# Patient Record
Sex: MALE | Race: WHITE | NOT HISPANIC OR LATINO | Employment: UNEMPLOYED | URBAN - METROPOLITAN AREA
[De-identification: names, ages, dates, MRNs, and addresses within clinical notes are randomized per-mention and may not be internally consistent; named-entity substitution may affect disease eponyms.]

---

## 2018-12-21 ENCOUNTER — HOSPITAL ENCOUNTER (EMERGENCY)
Facility: MEDICAL CENTER | Age: 33
End: 2018-12-21
Attending: EMERGENCY MEDICINE
Payer: MEDICAID

## 2018-12-21 VITALS
HEIGHT: 68 IN | SYSTOLIC BLOOD PRESSURE: 111 MMHG | WEIGHT: 161.82 LBS | BODY MASS INDEX: 24.52 KG/M2 | DIASTOLIC BLOOD PRESSURE: 65 MMHG | RESPIRATION RATE: 14 BRPM | OXYGEN SATURATION: 99 % | HEART RATE: 97 BPM | TEMPERATURE: 96.8 F

## 2018-12-21 DIAGNOSIS — J02.9 PHARYNGITIS, UNSPECIFIED ETIOLOGY: ICD-10-CM

## 2018-12-21 PROCEDURE — 700111 HCHG RX REV CODE 636 W/ 250 OVERRIDE (IP): Performed by: EMERGENCY MEDICINE

## 2018-12-21 PROCEDURE — 99284 EMERGENCY DEPT VISIT MOD MDM: CPT

## 2018-12-21 PROCEDURE — 96372 THER/PROPH/DIAG INJ SC/IM: CPT

## 2018-12-21 RX ORDER — IBUPROFEN 600 MG/1
600 TABLET ORAL ONCE
Status: DISCONTINUED | OUTPATIENT
Start: 2018-12-21 | End: 2018-12-21 | Stop reason: HOSPADM

## 2018-12-21 RX ORDER — DEXAMETHASONE SODIUM PHOSPHATE 4 MG/ML
10 INJECTION, SOLUTION INTRA-ARTICULAR; INTRALESIONAL; INTRAMUSCULAR; INTRAVENOUS; SOFT TISSUE ONCE
Status: COMPLETED | OUTPATIENT
Start: 2018-12-21 | End: 2018-12-21

## 2018-12-21 RX ADMIN — DEXAMETHASONE SODIUM PHOSPHATE 10 MG: 4 INJECTION, SOLUTION INTRAMUSCULAR; INTRAVENOUS at 10:33

## 2018-12-21 RX ADMIN — PENICILLIN G BENZATHINE 1.2 MILLION UNITS: 1200000 INJECTION, SUSPENSION INTRAMUSCULAR at 10:34

## 2018-12-21 ASSESSMENT — PAIN SCALES - GENERAL: PAINLEVEL_OUTOF10: 5

## 2018-12-21 NOTE — ED TRIAGE NOTES
"PT ambulated to triage c/o throat pain.  Chief Complaint   Patient presents with   • Sore Throat     Blood pressure 108/63, pulse (!) 116, temperature 35.8 °C (96.5 °F), temperature source Temporal, resp. rate 18, height 1.727 m (5' 8\"), weight 73.4 kg (161 lb 13.1 oz), SpO2 99 %.      "

## 2018-12-21 NOTE — DISCHARGE INSTRUCTIONS
Follow-up with primary care next week for reevaluation, to establish care, for medication management.    Tylenol and ibuprofen, alternating if needed, as needed for fever discomfort.  You were treated for strep pharyngitis with an intramuscular antibiotic in the emergency department.    Diet and activity as tolerated.    Return to the emergency department for persistent or worsening throat pain, difficulty swallowing or breathing, change in voice, headache/neck pain/stiffness, fever or other new concerns.

## 2018-12-21 NOTE — ED PROVIDER NOTES
"ED Provider Note    CHIEF COMPLAINT  Chief Complaint   Patient presents with   • Sore Throat       HPI  Walker Carrera is a 33 y.o. male who ambulates to the emergency department through triage for \"swollen tonsils.\"  Patient states 2 days of sore throat and swollen tonsils.  Pain with swallow tolerating food and fluids.  Tactile fever and chills.  Minimal nasal congestion.  No ear pain, cough.    History of \"tonsillitis and strep throat\" but cannot recall last treatment, more than 2 years.    REVIEW OF SYSTEMS  See HPI for further details.     PAST MEDICAL HISTORY       SOCIAL HISTORY  Social History     Social History Main Topics   • Smoking status: Current Every Day Smoker     Packs/day: 1.00   • Smokeless tobacco: Never Used   • Alcohol use No   • Drug use: No   • Sexual activity: Not on file       SURGICAL HISTORY  patient denies any surgical history    CURRENT MEDICATIONS  Home Medications    **Home medications have not yet been reviewed for this encounter**         ALLERGIES  No Known Allergies    PHYSICAL EXAM  VITAL SIGNS: /63   Pulse (!) 116   Temp 35.8 °C (96.5 °F) (Temporal)   Resp 18   Ht 1.727 m (5' 8\")   Wt 73.4 kg (161 lb 13.1 oz)   SpO2 99%   BMI 24.60 kg/m²   Pulse ox interpretation: I interpret this pulse ox as normal.  Constitutional: Alert in no apparent distress.  HENT: Normocephalic, atraumatic. Bilateral external ears normal, Nose normal. Moist mucous membranes.  Oropharynx diffusely erythematous.  Tonsils are enlarged but symmetric bilaterally with scattered exudate.  Uvula midline.  Tolerating secretions.  No stridor or dysphonia appear  Eyes: Pupils are equal and reactive, Conjunctiva normal.   Neck: Normal range of motion, Supple.  No meningeal irritation.  Lymphatic: Anterior cervical lymphadenopathy.  Cardiovascular: Mild tachycardia otherwise regular.  No murmur.  Normal peripheral perfusion.  Thorax & Lungs: Normal breath sounds.  No wheezing/rales/ronchi. No " increased work of breathing, clipped speech or retractions.   Skin: Warm, Dry  Musculoskeletal: Good range of motion in all major joints.  Neurologic: Alert and oriented x4.  Ambulates independently.  Psychiatric: Affect normal, Judgment normal, Mood normal.       COURSE & MEDICAL DECISION MAKING  ED evaluation for sore throat most consistent with pharyngitis, suspect bacterial etiology given clinical presentation.  No further clinical evidence for otitis media, sinusitis, meningitis or pneumonia.  No clinical evidence for peritonsillar abscess.  Vital signs are stable, patient is afebrile, mild tachycardia is appropriate in the setting.  No respiratory distress, airway compromise or hypoxia.  Tolerating secretions and medications in the emergency department.  Bicillin IM, Motrin, Decadron given in the emergency department.    Patient is stable for discharge at this time, anticipatory guidance provided, Tylenol or ibuprofen for fever discomfort, close follow-up is encouraged, and strict ED return instructions have been detailed. Patient is agreeable to the disposition and plan.    Patient's blood pressure was elevated in the emergency department, and has been referred to primary care for close monitoring.      FINAL IMPRESSION  (J02.9) Pharyngitis, unspecified etiology      Electronically signed by: Michelle Melissa, 12/21/2018 10:13 AM      This dictation was created using voice recognition software. The accuracy of the dictation is limited to the abilities of the software. I expect there may be some errors of grammar and possibly content. The nursing notes were reviewed and certain aspects of this information were incorporated into this note.

## 2019-01-04 ENCOUNTER — NON-PROVIDER VISIT (OUTPATIENT)
Dept: URGENT CARE | Facility: CLINIC | Age: 34
End: 2019-01-04

## 2019-01-04 DIAGNOSIS — Z02.1 PRE-EMPLOYMENT DRUG SCREENING: ICD-10-CM

## 2019-01-04 LAB
AMP AMPHETAMINE: NORMAL
BAR BARBITURATES: NORMAL
BZO BENZODIAZEPINES: NORMAL
COC COCAINE: NORMAL
INT CON NEG: NORMAL
INT CON POS: NORMAL
MDMA ECSTASY: NORMAL
MET METHAMPHETAMINES: NORMAL
MTD METHADONE: NORMAL
OPI OPIATES: NORMAL
OXY OXYCODONE: NORMAL
PCP PHENCYCLIDINE: NORMAL
POC URINE DRUG SCREEN OCDRS: NORMAL
THC: NORMAL

## 2019-01-04 PROCEDURE — 80305 DRUG TEST PRSMV DIR OPT OBS: CPT | Performed by: NURSE PRACTITIONER

## 2019-01-06 ENCOUNTER — APPOINTMENT (OUTPATIENT)
Dept: RADIOLOGY | Facility: MEDICAL CENTER | Age: 34
End: 2019-01-06
Attending: EMERGENCY MEDICINE
Payer: MEDICAID

## 2019-01-06 ENCOUNTER — HOSPITAL ENCOUNTER (EMERGENCY)
Facility: MEDICAL CENTER | Age: 34
End: 2019-01-06
Attending: EMERGENCY MEDICINE
Payer: MEDICAID

## 2019-01-06 VITALS
BODY MASS INDEX: 25.76 KG/M2 | RESPIRATION RATE: 16 BRPM | HEART RATE: 98 BPM | DIASTOLIC BLOOD PRESSURE: 58 MMHG | SYSTOLIC BLOOD PRESSURE: 122 MMHG | OXYGEN SATURATION: 99 % | WEIGHT: 170 LBS | TEMPERATURE: 97.6 F | HEIGHT: 68 IN

## 2019-01-06 DIAGNOSIS — S93.402A SPRAIN OF LEFT ANKLE, UNSPECIFIED LIGAMENT, INITIAL ENCOUNTER: ICD-10-CM

## 2019-01-06 PROCEDURE — 73610 X-RAY EXAM OF ANKLE: CPT | Mod: LT

## 2019-01-06 PROCEDURE — 99284 EMERGENCY DEPT VISIT MOD MDM: CPT

## 2019-01-06 RX ORDER — NAPROXEN 500 MG/1
500 TABLET ORAL 2 TIMES DAILY WITH MEALS
Qty: 30 TAB | Refills: 0 | Status: SHIPPED | OUTPATIENT
Start: 2019-01-06 | End: 2019-01-06

## 2019-01-06 RX ORDER — NAPROXEN 500 MG/1
500 TABLET ORAL 2 TIMES DAILY WITH MEALS
Qty: 30 TAB | Refills: 0 | Status: SHIPPED | OUTPATIENT
Start: 2019-01-06 | End: 2019-07-02

## 2019-01-06 ASSESSMENT — PAIN SCALES - GENERAL: PAINLEVEL_OUTOF10: 9

## 2019-01-06 ASSESSMENT — LIFESTYLE VARIABLES: DO YOU DRINK ALCOHOL: NO

## 2019-01-06 NOTE — ED NOTES
Pt given discharge instructions/prescription given/ home care instructions explained, pt verbalized understanding of instructions given, pt ambulatory to [facility].

## 2019-01-06 NOTE — ED PROVIDER NOTES
"ED Provider Note    CHIEF COMPLAINT  Chief Complaint   Patient presents with   • Ankle Pain     rolled LT ankle 3 times in 24hrs . pain radiates into knee.     Seen at 11:40 AM    HPI  Walker Carrera is a 33 y.o. male who presents with left ankle sprain.  He rolled his ankle multiple times in the past day.  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  He is aware of the typical clinical course and treatment.  He was here to rule out a fracture.  He denies any numbness, weakness.    REVIEW OF SYSTEMS  See HPI  PAST MEDICAL HISTORY       SOCIAL HISTORY  Social History     Social History Main Topics   • Smoking status: Current Every Day Smoker     Packs/day: 1.00     Types: Cigarettes   • Smokeless tobacco: Never Used   • Alcohol use No   • Drug use: No   • Sexual activity: Not on file       SURGICAL HISTORY  patient denies any surgical history    CURRENT MEDICATIONS  Reviewed.  See Encounter Summary.     ALLERGIES  No Known Allergies    PHYSICAL EXAM  VITAL SIGNS: /58   Pulse 98   Temp 36.4 °C (97.6 °F) (Temporal)   Resp 16   Ht 1.727 m (5' 8\")   Wt 77.1 kg (170 lb)   SpO2 99%   BMI 25.85 kg/m²   Constitutional: Awake, alert in no apparent distress.  HENT: Normocephalic, Bilateral external ears normal. Nose normal.   Eyes: Conjunctiva normal, non-icteric, EOMI.    Thorax & Lungs: Easy unlabored respirations  Cardiovascular:    Abdomen:  No distention  Skin: Visualized skin is  Dry, No erythema, No rash.   Extremities:   Left lower extremity: No tenderness in the left knee, calf or foot.  The patient does have some mild tenderness in the lateral malleoli region.  The foot is grossly normal, there is no ecchymosis.  Dorsalis pedis 2+.  Negative drawer.  Negative Hernandez's.  Neurologic: Alert, Grossly non-focal.   Psychiatric: Affect and Mood normal    RADIOLOGY  DX-ANKLE 3+ VIEWS LEFT   Final Result      Negative left ankle series          Nursing notes and vital " signs were reviewed. (See chart for details)    Decision Making:  This is a 33 y.o. year old male who presents with an ankle inversion injury.  X-rays are negative for fracture.  The patient does not have any sign of ligamentous rupture.  The foot is nontender.  The patient has done this in the past and is well aware of the treatment.  I recommend a sports brace that can be purchased over-the-counter so that he can wear this inside of his foot wear.  No specific follow-up with regard to ankle injuries are required.    DISPOSITION:  Patient will be discharged home in good condition.    Discharge Medications:  Discharge Medication List as of 1/6/2019 12:39 PM      START taking these medications    Details   naproxen (NAPROSYN) 500 MG Tab Take 1 Tab by mouth 2 times a day, with meals., Disp-30 Tab, R-0, Print Rx Paper             The patient was discharged home (see d/c instructions) and told to return immediately for any signs or symptoms listed, or any worsening at all.  The patient verbally agreed to the discharge precautions and follow-up plan which is documented in EPIC.      The patient's blood pressure is elevated today. >120/80. I have referred them to primary care for follow up.       FINAL IMPRESSION  1. Sprain of left ankle, unspecified ligament, initial encounter

## 2019-01-06 NOTE — ED TRIAGE NOTES
"Chief Complaint   Patient presents with   • Ankle Pain     rolled LT ankle 3 times in 24hrs . pain radiates into knee.     Pt to triage for above. Ambulatory, CMS intact. Pt returned to lobby. Educated on triage process and to inform staff of any changes.     /57   Pulse 99   Temp 36.4 °C (97.6 °F) (Temporal)   Resp 16   Ht 1.727 m (5' 8\")   Wt 77.1 kg (170 lb)   SpO2 99%   BMI 25.85 kg/m²     "

## 2019-01-07 ENCOUNTER — HOSPITAL ENCOUNTER (EMERGENCY)
Facility: MEDICAL CENTER | Age: 34
End: 2019-01-07
Attending: EMERGENCY MEDICINE
Payer: MEDICAID

## 2019-01-07 VITALS
BODY MASS INDEX: 27.69 KG/M2 | OXYGEN SATURATION: 95 % | DIASTOLIC BLOOD PRESSURE: 68 MMHG | TEMPERATURE: 99 F | HEART RATE: 94 BPM | WEIGHT: 182.1 LBS | SYSTOLIC BLOOD PRESSURE: 116 MMHG | RESPIRATION RATE: 16 BRPM

## 2019-01-07 DIAGNOSIS — R19.7 NAUSEA VOMITING AND DIARRHEA: ICD-10-CM

## 2019-01-07 DIAGNOSIS — R11.2 NAUSEA VOMITING AND DIARRHEA: ICD-10-CM

## 2019-01-07 PROCEDURE — 700111 HCHG RX REV CODE 636 W/ 250 OVERRIDE (IP): Performed by: EMERGENCY MEDICINE

## 2019-01-07 PROCEDURE — 99284 EMERGENCY DEPT VISIT MOD MDM: CPT

## 2019-01-07 RX ORDER — ONDANSETRON 4 MG/1
4 TABLET, ORALLY DISINTEGRATING ORAL EVERY 8 HOURS PRN
Qty: 20 TAB | Refills: 0 | Status: SHIPPED | OUTPATIENT
Start: 2019-01-07 | End: 2020-03-09

## 2019-01-07 RX ORDER — LOPERAMIDE HYDROCHLORIDE 2 MG/1
2 CAPSULE ORAL 4 TIMES DAILY PRN
Qty: 30 CAP | Refills: 0 | Status: SHIPPED | OUTPATIENT
Start: 2019-01-07 | End: 2020-03-09

## 2019-01-07 RX ORDER — ONDANSETRON 4 MG/1
4 TABLET, ORALLY DISINTEGRATING ORAL ONCE
Status: COMPLETED | OUTPATIENT
Start: 2019-01-07 | End: 2019-01-07

## 2019-01-07 RX ADMIN — ONDANSETRON 4 MG: 4 TABLET, ORALLY DISINTEGRATING ORAL at 12:43

## 2019-01-07 ASSESSMENT — ENCOUNTER SYMPTOMS
FEVER: 1
ABDOMINAL PAIN: 1
NAUSEA: 1
CHILLS: 1
BLOOD IN STOOL: 0
VOMITING: 1
DIARRHEA: 1

## 2019-01-07 NOTE — ED PROVIDER NOTES
"ED Provider Note    Scribed for Paul Bailey M.D. by Angel Luis Goldman. 1/7/2019, 12:30 PM.    Primary care provider: Pcp Pt States None  Means of arrival: Walk-in  History obtained from: Patient  History limited by: None    CHIEF COMPLAINT  Chief Complaint   Patient presents with   • Nausea/Vomiting/Diarrhea     x 2 days    • Body Aches       HPI  Walker Carrera is a 33 y.o. male who presents to the Emergency Department with complaints of nausea, vomiting, and diarrhea onset 2 days ago. Per patient, approximately 2 days ago he began to develop chills and a subjective fever. Patient explains that the next day he also began to experience vomiting, diarrhea, lower abdominal pain, and nausea. He describes his abdominal pain as a \"sharp\" sensation and admits that these sensations have been intermittent. The patient has not vomited at all today but does admit that he has been nauseated all day. He has not had any blood in his stool recently. He reports that he is otherwise a healthy individual and does not have any other pertinent past medical problems.     REVIEW OF SYSTEMS  Review of Systems   Constitutional: Positive for chills and fever (subjective).   Gastrointestinal: Positive for abdominal pain (lower), diarrhea, nausea and vomiting. Negative for blood in stool and melena.       PAST MEDICAL HISTORY    No history pertinent to complaint.    SURGICAL HISTORY  patient denies any surgical history    SOCIAL HISTORY  Social History   Substance Use Topics   • Smoking status: Current Every Day Smoker     Packs/day: 1.00     Types: Cigarettes   • Smokeless tobacco: Never Used   • Alcohol use No      History   Drug Use No       FAMILY HISTORY  No family history noted    CURRENT MEDICATIONS  Home Medications     Reviewed by Geovanna Brooks R.N. (Registered Nurse) on 01/07/19 at 0956  Med List Status: Partial   Medication Last Dose Status   naproxen (NAPROSYN) 500 MG Tab prn Active                ALLERGIES  No Known " Allergies    PHYSICAL EXAM  VITAL SIGNS: /68   Pulse 84   Temp 37.6 °C (99.7 °F) (Temporal)   Resp 16   Wt 82.6 kg (182 lb 1.6 oz)   SpO2 100%   BMI 27.69 kg/m²     Constitutional:  Mild distress  HENT:  Moist mucous membranes  Eyes:  No conjunctivitis or icterus  Lymphatic:  No cervical lymphadenopathy  Cardiovascular:  Regular rate and rhythm, no murmurs  Thorax & Lungs:  Normal breath sounds, no rhonchi  Abdomen: Soft. No reproducible abdominal tenderness to palpation.   Skin:. No rash  Neurologic:  Normal gross motor    COURSE & MEDICAL DECISION MAKING  Pertinent Labs & Imaging studies reviewed. (See chart for details)    12:30 PM - Patient seen and examined at bedside. Patient will be treated with 4 mg Zofran ODT for nausea. The differential diagnoses include but are not limited to: Viral syndrome          The patient will return for new or worsening symptoms and is stable at the time of discharge.    DISPOSITION:  Patient will be discharged home in stable condition.    FOLLOW UP:  Follow-up with your PCP as soon as possible.       OUTPATIENT MEDICATIONS:  New Prescriptions    LOPERAMIDE (IMODIUM) 2 MG CAP    Take 1 Cap by mouth 4 times a day as needed for Diarrhea.    ONDANSETRON (ZOFRAN ODT) 4 MG TABLET DISPERSIBLE    Take 1 Tab by mouth every 8 hours as needed.       FINAL IMPRESSION  1. Nausea vomiting and diarrhea          Angel Luis DEMPSEY (Maulikibe), am scribing for, and in the presence of, Paul Bailey M.D..    Electronically signed by: Angel Luis Goldman (Maulikibjulito), 1/7/2019    Paul DEMPSEY M.D. personally performed the services described in this documentation, as scribed by Angel Luis Goldman in my presence, and it is both accurate and complete. E.     The note accurately reflects work and decisions made by me.  Paul Bailey  1/7/2019  5:12 PM

## 2019-01-07 NOTE — ED NOTES
"Medicated pt for nausea, pt ask if he could stay in the room for awhile.  Explained pt I cannot hold room we have a long wait in lobby. He said \" it is nice to lay in this gurney than the shelter\"  "

## 2019-01-07 NOTE — ED TRIAGE NOTES
Pt triage .  Chief Complaint   Patient presents with   • Nausea/Vomiting/Diarrhea     x 2 days    • Body Aches

## 2019-01-15 ENCOUNTER — APPOINTMENT (OUTPATIENT)
Dept: RADIOLOGY | Facility: MEDICAL CENTER | Age: 34
End: 2019-01-15
Attending: EMERGENCY MEDICINE
Payer: MEDICAID

## 2019-01-15 ENCOUNTER — HOSPITAL ENCOUNTER (EMERGENCY)
Facility: MEDICAL CENTER | Age: 34
End: 2019-01-15
Attending: EMERGENCY MEDICINE
Payer: MEDICAID

## 2019-01-15 VITALS
RESPIRATION RATE: 16 BRPM | OXYGEN SATURATION: 96 % | DIASTOLIC BLOOD PRESSURE: 57 MMHG | HEART RATE: 94 BPM | WEIGHT: 187 LBS | BODY MASS INDEX: 28.34 KG/M2 | TEMPERATURE: 98.3 F | HEIGHT: 68 IN | SYSTOLIC BLOOD PRESSURE: 131 MMHG

## 2019-01-15 DIAGNOSIS — J40 BRONCHITIS: ICD-10-CM

## 2019-01-15 DIAGNOSIS — R07.9 CHEST PAIN, UNSPECIFIED TYPE: ICD-10-CM

## 2019-01-15 PROCEDURE — 700111 HCHG RX REV CODE 636 W/ 250 OVERRIDE (IP): Performed by: EMERGENCY MEDICINE

## 2019-01-15 PROCEDURE — 99284 EMERGENCY DEPT VISIT MOD MDM: CPT

## 2019-01-15 PROCEDURE — 71046 X-RAY EXAM CHEST 2 VIEWS: CPT

## 2019-01-15 PROCEDURE — 96372 THER/PROPH/DIAG INJ SC/IM: CPT

## 2019-01-15 RX ORDER — AZITHROMYCIN 250 MG/1
TABLET, FILM COATED ORAL
Qty: 1 QUANTITY SUFFICIENT | Refills: 0 | Status: SHIPPED | OUTPATIENT
Start: 2019-01-15 | End: 2020-03-09

## 2019-01-15 RX ORDER — KETOROLAC TROMETHAMINE 30 MG/ML
15 INJECTION, SOLUTION INTRAMUSCULAR; INTRAVENOUS ONCE
Status: COMPLETED | OUTPATIENT
Start: 2019-01-15 | End: 2019-01-15

## 2019-01-15 RX ADMIN — KETOROLAC TROMETHAMINE 15 MG: 30 INJECTION, SOLUTION INTRAMUSCULAR; INTRAVENOUS at 19:15

## 2019-01-15 ASSESSMENT — PAIN SCALES - GENERAL
PAINLEVEL_OUTOF10: 6
PAINLEVEL_OUTOF10: 6

## 2019-01-16 NOTE — ED TRIAGE NOTES
Chief Complaint   Patient presents with   • Flank Pain     left upper side   • Cough     green yellow sputum. coughing for 3 days.

## 2019-01-16 NOTE — ED PROVIDER NOTES
"ED Provider Note    Scribed for Dave Davis D.O. by Felipe Rutledge. 1/15/2019  6:59 PM    Primary care provider: Pcp Pt States None  Means of arrival: Walk in   History obtained from: Patient  History limited by: None    CHIEF COMPLAINT  Chief Complaint   Patient presents with   • Flank Pain     left upper side   • Cough     green yellow sputum. coughing for 3 days.       HPI  Walker Carrera is a 33 y.o. male who presents to the Emergency Department complaining of a productive cough that first developed 3 days ago. Patient states he has been coughing up yellow and green sputum. He has also had an intermittent fever and chills, as well as left flank pain. Patient has not gotten a flu shot this year. Patient is an everyday smoker, however has not been smoking since he got sick.      REVIEW OF SYSTEMS  Pertinent positives include productive cough, left flank pain, fevers, chills.     PAST MEDICAL HISTORY  None    SURGICAL HISTORY  None    SOCIAL HISTORY  Social History   Substance Use Topics   • Smoking status: Current Every Day Smoker     Packs/day: 1.00     Types: Cigarettes   • Smokeless tobacco: Never Used   • Alcohol use No      History   Drug Use No       FAMILY HISTORY  None    CURRENT MEDICATIONS  No current facility-administered medications on file prior to encounter.      Current Outpatient Prescriptions on File Prior to Encounter   Medication Sig Dispense Refill   • ondansetron (ZOFRAN ODT) 4 MG TABLET DISPERSIBLE Take 1 Tab by mouth every 8 hours as needed. 20 Tab 0   • loperamide (IMODIUM) 2 MG Cap Take 1 Cap by mouth 4 times a day as needed for Diarrhea. 30 Cap 0   • naproxen (NAPROSYN) 500 MG Tab Take 1 Tab by mouth 2 times a day, with meals. 30 Tab 0      ALLERGIES  No Known Allergies    PHYSICAL EXAM  VITAL SIGNS: /80   Pulse 90   Temp 36.8 °C (98.3 °F) (Temporal)   Resp 16   Ht 1.727 m (5' 8\")   Wt 84.8 kg (187 lb)   SpO2 99%   BMI 28.43 kg/m²     Nursing notes and " vitals reviewed.  Constitutional: Well developed, Well nourished, No acute distress, Non-toxic appearance.   HENT: No pharyngeal erythema.  Eyes: PERRLA, EOMI, Conjunctiva normal, No discharge.   Cardiovascular: Normal heart rate, Normal rhythm, No murmurs, No rubs, No gallops.   Thorax & Lungs: Rhonchi noted to lower left lobe, right side is normal. No accessory muscle use. No respiratory distress.  Abdomen: Bowel sounds normal, Soft, No tenderness, No guarding, No rebound, No masses, No pulsatile masses.   Skin: Warm, Dry, No erythema, No rash.   Musculoskeletal: Intact distal pulses, No pedal edema, No cyanosis, No clubbing. Good range of motion in all major joints. No tenderness to palpation or major deformities noted  Neurologic: Alert & oriented x 3, Normal motor function, Normal sensory function, No focal deficits noted.  Psychiatric: Affect normal for clinical presentation.    DIAGNOSTIC STUDIES/PROCEDURES    RADIOLOGY  DX-CHEST-2 VIEWS   Final Result      No active disease.        The radiologist's interpretation of all radiological studies have been reviewed by me.    COURSE & MEDICAL DECISION MAKING  Pertinent Labs & Imaging studies reviewed. (See chart for details)    6:59 PM - Patient seen and examined at bedside. Patient will be treated with toradol 15mg. Ordered chest xray to evaluate his symptoms.     8:09 PM - I informed Walker that the xray does not indicate any acute processes, however he likely has bronchitis based on his exam, which can be treated at home with antibiotics. Patient has no further questions at this time and agrees to be discharged home.    This is a [facility] 33 y.o. male that presents with possible pneumonia.  X-rays completed was negative for pneumonia.  Patient is high risk living in a shelter therefore I will be giving a prescription for azithromycin.  He has no evidence of impending respiratory distress.  Prior to discharge, vital signs reviewed, strict return precautions  have been given.    The patient will return for new or worsening symptoms and is stable at the time of discharge.        DISPOSITION:  Patient will be discharged home in stable condition.    FOLLOW UP:  [facility], Emergency Dept  [address] [facility] [address] [phone]    If symptoms worsen    [address] [address]  [phone]  Schedule an appointment as soon as possible for a visit in 1 week        OUTPATIENT MEDICATIONS:  New Prescriptions    AZITHROMYCIN (ZITHROMAX) 250 MG TAB    Take 2 tablets (500 mg) PO on day 1 and then take 1 tablet (250 mg) daily on 2-5         FINAL IMPRESSION  1. Bronchitis Active   2. Chest pain, unspecified type Active         I, Felipe Rutledge (Scribe), am scribing for, and in the presence of, Dave Davis D.O    Electronically signed by: Felipe Rutledge (Scribe), 1/15/2019    IDave D.O. personally performed the services described in this documentation, as scribed by Felipe Rutledge in my presence, and it is both accurate and complete. E.    The note accurately reflects work and decisions made by me.  Dave Davis  1/15/2019  11:43 PM

## 2019-01-16 NOTE — ED NOTES
All lines and monitors discontinued. Discharge instructions given, questions answered.    ambulatory out of ER. Instructed not to drive after taking pain medication and pt verbalizes understanding.  Rx x 1 given.

## 2019-04-07 ENCOUNTER — HOSPITAL ENCOUNTER (EMERGENCY)
Dept: HOSPITAL 8 - ED | Age: 34
Discharge: HOME | End: 2019-04-07
Payer: MEDICAID

## 2019-04-07 VITALS — WEIGHT: 176.15 LBS | BODY MASS INDEX: 26.7 KG/M2 | HEIGHT: 68 IN

## 2019-04-07 VITALS — SYSTOLIC BLOOD PRESSURE: 124 MMHG | DIASTOLIC BLOOD PRESSURE: 78 MMHG

## 2019-04-07 DIAGNOSIS — B34.9: Primary | ICD-10-CM

## 2019-04-07 PROCEDURE — 99282 EMERGENCY DEPT VISIT SF MDM: CPT

## 2019-07-02 ENCOUNTER — APPOINTMENT (OUTPATIENT)
Dept: RADIOLOGY | Facility: MEDICAL CENTER | Age: 34
End: 2019-07-02
Attending: EMERGENCY MEDICINE
Payer: MEDICAID

## 2019-07-02 ENCOUNTER — HOSPITAL ENCOUNTER (EMERGENCY)
Facility: MEDICAL CENTER | Age: 34
End: 2019-07-02
Attending: EMERGENCY MEDICINE
Payer: MEDICAID

## 2019-07-02 VITALS
RESPIRATION RATE: 20 BRPM | HEIGHT: 68 IN | DIASTOLIC BLOOD PRESSURE: 81 MMHG | BODY MASS INDEX: 25.69 KG/M2 | TEMPERATURE: 98.1 F | HEART RATE: 96 BPM | OXYGEN SATURATION: 97 % | WEIGHT: 169.53 LBS | SYSTOLIC BLOOD PRESSURE: 124 MMHG

## 2019-07-02 DIAGNOSIS — J06.9 VIRAL UPPER RESPIRATORY TRACT INFECTION WITH COUGH: ICD-10-CM

## 2019-07-02 DIAGNOSIS — R07.81 CHEST PAIN, PLEURITIC: ICD-10-CM

## 2019-07-02 PROCEDURE — A9270 NON-COVERED ITEM OR SERVICE: HCPCS | Performed by: EMERGENCY MEDICINE

## 2019-07-02 PROCEDURE — 96374 THER/PROPH/DIAG INJ IV PUSH: CPT

## 2019-07-02 PROCEDURE — 99284 EMERGENCY DEPT VISIT MOD MDM: CPT

## 2019-07-02 PROCEDURE — 71046 X-RAY EXAM CHEST 2 VIEWS: CPT

## 2019-07-02 PROCEDURE — 700111 HCHG RX REV CODE 636 W/ 250 OVERRIDE (IP): Performed by: EMERGENCY MEDICINE

## 2019-07-02 PROCEDURE — 700102 HCHG RX REV CODE 250 W/ 637 OVERRIDE(OP): Performed by: EMERGENCY MEDICINE

## 2019-07-02 RX ORDER — NAPROXEN 500 MG/1
500 TABLET ORAL 2 TIMES DAILY WITH MEALS
Qty: 30 TAB | Refills: 0 | Status: SHIPPED | OUTPATIENT
Start: 2019-07-02 | End: 2020-03-09

## 2019-07-02 RX ORDER — BENZONATATE 100 MG/1
100 CAPSULE ORAL ONCE
Status: COMPLETED | OUTPATIENT
Start: 2019-07-02 | End: 2019-07-02

## 2019-07-02 RX ORDER — KETOROLAC TROMETHAMINE 30 MG/ML
15 INJECTION, SOLUTION INTRAMUSCULAR; INTRAVENOUS ONCE
Status: COMPLETED | OUTPATIENT
Start: 2019-07-02 | End: 2019-07-02

## 2019-07-02 RX ORDER — DEXTROMETHORPHAN HBR. AND GUAIFENESIN 10; 100 MG/5ML; MG/5ML
10 SOLUTION ORAL EVERY 4 HOURS PRN
Qty: 1 BOTTLE | Refills: 0 | Status: SHIPPED | OUTPATIENT
Start: 2019-07-02 | End: 2020-03-09

## 2019-07-02 RX ADMIN — KETOROLAC TROMETHAMINE 15 MG: 30 INJECTION, SOLUTION INTRAMUSCULAR at 22:33

## 2019-07-02 RX ADMIN — BENZONATATE 100 MG: 100 CAPSULE ORAL at 22:33

## 2019-07-02 ASSESSMENT — LIFESTYLE VARIABLES: DO YOU DRINK ALCOHOL: NO

## 2019-07-03 NOTE — ED TRIAGE NOTES
"Chief Complaint   Patient presents with   • Cough     for 3-4 days   • Chest Wall Pain     on R side that wraps around. worse when I cough and breathe deeply     Pt ambulatory to triage for above complaint. Pt states he is coughing up yellow stuff and that \" I had a really good cough and it just really hurt my R side\" pt denies SOB or difficulty breathing at this time. Pt states \" you have to admit me there's no way a bus will be running in time to take me back to [facility]\". Pt also reports recent meth use about 2 days ago. Pt educated on triage process and informed to notify staff of any changes or worsening.   /81   Pulse (!) 101   Temp 36.7 °C (98.1 °F) (Temporal)   Resp 18   Ht 1.727 m (5' 8\")   Wt 76.9 kg (169 lb 8.5 oz)   SpO2 100%   BMI 25.78 kg/m²     "

## 2019-07-03 NOTE — ED PROVIDER NOTES
"ED Provider Note    CHIEF COMPLAINT  Chief Complaint   Patient presents with   • Cough     for 3-4 days   • Chest Wall Pain     on R side that wraps around. worse when I cough and breathe deeply       HPI  Walker Carrera is a 34 y.o. male who presents to the emergency department chief complaint of a sore throat and a cough for the last 3 to 4 days.  Patient states he is got right-sided chest wall pain that is worse with coughing.  He denies any leg swelling or shortness of breath he states however when he coughs and takes a deep breath he has some right-sided chest discomfort.  He does use amphetamines last used 2 days ago.  He is unsure if he had any fevers or chills at first cough was productive now is just dry.  He has not been taking anything for pain nor anything to help with cough.  He denies any trauma to the area denies abdominal pain nausea or vomiting or hematemesis or hemoptysis    REVIEW OF SYSTEMS  Positives as above. Pertinent negatives include nausea vomiting hemoptysis fevers chills nasal congestion leg swelling rash trauma  All other review of systems are negative    PAST MEDICAL HISTORY       SOCIAL HISTORY  Social History     Social History Main Topics   • Smoking status: Current Every Day Smoker     Packs/day: 0.50     Types: Cigarettes   • Smokeless tobacco: Never Used   • Alcohol use Yes   • Drug use: Yes      Comment: meth 2 days ago   • Sexual activity: Not on file       SURGICAL HISTORY  patient denies any surgical history    CURRENT MEDICATIONS  Home Medications    **Home medications have not yet been reviewed for this encounter**         ALLERGIES  No Known Allergies    PHYSICAL EXAM  VITAL SIGNS: /81   Pulse 93   Temp 36.7 °C (98.1 °F) (Temporal)   Resp (!) 37   Ht 1.727 m (5' 8\")   Wt 76.9 kg (169 lb 8.5 oz)   SpO2 100%   BMI 25.78 kg/m²    Pulse ox interpretation: I interpret this pulse ox as normal.  Constitutional: Alert in no apparent distress.  HENT: " Normocephalic atraumatic, MMM  Eyes: PER, Conjunctiva normal, Non-icteric.   Cardiovascular: Regular rate and rhythm, no murmurs.   Thorax & Lungs: Normal breath sounds, No respiratory distress, No wheezing, right chest wall tenderness on examination wraps around to the back without rash or ecchymosis  Abdomen: Bowel sounds normal, Soft, No tenderness, No pulsatile masses. No peritoneal signs.  Extremities: Intact distal pulses, No edema, No tenderness, No cyanosis  Musculoskeletal: Good range of motion in all major joints. No tenderness to palpation or major deformities noted.   Neurologic: Alert and oriented x3, No focal deficits noted.       DIFFERENTIAL DIAGNOSIS AND WORK UP PLAN    This is a 34 y.o. male who presents with a cough for last 3 to 4 days with pleuritic-like chest pain, concern for pleurisy in setting of possible community acquired pneumonia spontaneous pneumothorax viral upper respiratory infection, low concern for pulmonary embolism the patient is PERC negative and low concern for an atypical ACS.  Treat the patient with some IV Toradol for pain management and a chest x-ray as well as some oral Tessalon Perles    DIAGNOSTIC STUDIES / PROCEDURES    EKG  No results found for this or any previous visit.    LABS  Pertinent Lab Findings    Labs Reviewed - No data to display    RADIOLOGY  DX-CHEST-2 VIEWS   Final Result      No evidence of acute cardiopulmonary disease        The radiologist's interpretation of all radiological studies have been reviewed by me.      COURSE & MEDICAL DECISION MAKING  Pertinent Labs & Imaging studies reviewed. (See chart for details)    11:02 PM  Reassessed patient at the bedside, he is been sleeping resting comfortably has not been hypoxic his chest x-ray is within normal limits, we discussed likely viral upper respiratory infection and the plan for discharge.  He is asking for admission because he does not want to get all the way back to [place] this evening, I offered  "him a sandwich instead and he is accepted this    /79   Pulse 96   Temp 36.7 °C (98.1 °F) (Temporal)   Resp 20   Ht 1.727 m (5' 8\")   Wt 76.9 kg (169 lb 8.5 oz)   SpO2 97%   BMI 25.78 kg/m²     The patient will return for new or worsening symptoms and is stable at the time of discharge.    The patient is referred to a primary physician for blood pressure management, diabetic screening, and for all other preventative health concerns.    DISPOSITION:  Patient will be discharged home in stable condition.    FOLLOW UP:  [facility], Emergency Dept  [address] [address] [phone]    If symptoms worsen      OUTPATIENT MEDICATIONS:  Discharge Medication List as of 7/2/2019 11:06 PM      START taking these medications    Details   guaifenesin dextromethorphan sugar free (DIABETIC TUSSIN DM)  MG/5ML Liquid soln Take 10 mL by mouth every four hours as needed for Cough., Disp-1 Bottle, R-0, Print Rx Paper               FINAL IMPRESSION  1. Viral upper respiratory tract infection with cough    2. Chest pain, pleuritic              Electronically signed by: Michelle Wiggins, 7/2/2019 10:10 PM    This dictation has been created using voice recognition software and/or scribes. The accuracy of the dictation is limited by the abilities of the software and the expertise of the scribes. I expect there may be some errors of grammar and possibly content. I made every attempt to manually correct the errors within my dictation. However, errors related to voice recognition software and/or scribes may still exist and should be interpreted within the appropriate context.    "

## 2019-07-03 NOTE — ED NOTES
Pt discharged to home.  IV removed and bandaged.  No noted swelling or redness.  Pt tolerated well.  Catheter intact.  Pt given discharge paperwork and all applicable prescriptions written by provider.  Pt to follow up with PCP if indicated in discharge instructions.  Pt verbalized understanding of discharge teaching and will return to ED if condition worsens.

## 2020-03-09 VITALS
SYSTOLIC BLOOD PRESSURE: 148 MMHG | DIASTOLIC BLOOD PRESSURE: 88 MMHG | TEMPERATURE: 97.9 F | BODY MASS INDEX: 26.5 KG/M2 | OXYGEN SATURATION: 96 % | RESPIRATION RATE: 18 BRPM | WEIGHT: 174.82 LBS | HEART RATE: 98 BPM | HEIGHT: 68 IN

## 2020-03-09 PROCEDURE — 99284 EMERGENCY DEPT VISIT MOD MDM: CPT

## 2020-03-10 ENCOUNTER — HOSPITAL ENCOUNTER (EMERGENCY)
Facility: MEDICAL CENTER | Age: 35
End: 2020-03-10
Attending: EMERGENCY MEDICINE
Payer: COMMERCIAL

## 2020-03-10 DIAGNOSIS — Z59.00 HOMELESS: ICD-10-CM

## 2020-03-10 SDOH — ECONOMIC STABILITY - HOUSING INSECURITY: HOMELESSNESS UNSPECIFIED: Z59.00
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
## 2020-03-10 NOTE — ED NOTES
[facility] contacted about pt being possibly being exposed to COVID-19. [facility] stated that since patient doesn't have any sign or symptoms at the moment, he will not be tested for COVID-19, pt's contact information given to [facility] for follow up. ERP notified.     
Charge RN:   
Pt refused resources from social work.discharged from ED.   
No

## 2020-03-10 NOTE — DISCHARGE PLANNING
"Medical Social Work    MSW received a call from bedside RN that per ERP pt is in need of resources.  MSW met with pt at bedside and attempted to provide him with shelter information and [facility] [employer].  Pt states \"I want to stay somewhere other than the shelter\".  Pt was advised that there are no additional resources.  Pt states that he is aware of where the shelter is and declined the resources.  Bedside RN updated.  "
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
## 2020-04-25 ENCOUNTER — HOSPITAL ENCOUNTER (EMERGENCY)
Facility: MEDICAL CENTER | Age: 35
End: 2020-04-25
Attending: EMERGENCY MEDICINE
Payer: COMMERCIAL

## 2020-04-25 ENCOUNTER — APPOINTMENT (OUTPATIENT)
Dept: RADIOLOGY | Facility: MEDICAL CENTER | Age: 35
End: 2020-04-25
Attending: EMERGENCY MEDICINE
Payer: COMMERCIAL

## 2020-04-25 VITALS
BODY MASS INDEX: 26.76 KG/M2 | HEIGHT: 68 IN | RESPIRATION RATE: 16 BRPM | HEART RATE: 83 BPM | DIASTOLIC BLOOD PRESSURE: 95 MMHG | WEIGHT: 176.59 LBS | SYSTOLIC BLOOD PRESSURE: 136 MMHG | TEMPERATURE: 97.7 F | OXYGEN SATURATION: 98 %

## 2020-04-25 DIAGNOSIS — S02.119A CLOSED FRACTURE OF LEFT SIDE OF OCCIPITAL BONE, UNSPECIFIED OCCIPITAL FRACTURE TYPE, INITIAL ENCOUNTER (HCC): ICD-10-CM

## 2020-04-25 PROCEDURE — 700102 HCHG RX REV CODE 250 W/ 637 OVERRIDE(OP): Performed by: EMERGENCY MEDICINE

## 2020-04-25 PROCEDURE — 70450 CT HEAD/BRAIN W/O DYE: CPT

## 2020-04-25 PROCEDURE — A9270 NON-COVERED ITEM OR SERVICE: HCPCS | Performed by: EMERGENCY MEDICINE

## 2020-04-25 PROCEDURE — 99284 EMERGENCY DEPT VISIT MOD MDM: CPT

## 2020-04-25 RX ORDER — HYDROCODONE BITARTRATE AND ACETAMINOPHEN 5; 325 MG/1; MG/1
1-2 TABLET ORAL EVERY 6 HOURS PRN
Qty: 16 TAB | Refills: 0 | Status: SHIPPED | OUTPATIENT
Start: 2020-04-25 | End: 2020-04-28

## 2020-04-25 RX ORDER — HYDROCODONE BITARTRATE AND ACETAMINOPHEN 5; 325 MG/1; MG/1
1 TABLET ORAL ONCE
Status: COMPLETED | OUTPATIENT
Start: 2020-04-25 | End: 2020-04-25

## 2020-04-25 RX ADMIN — HYDROCODONE BITARTRATE AND ACETAMINOPHEN 1 TABLET: 5; 325 TABLET ORAL at 13:06

## 2020-04-25 NOTE — ED NOTES
Patient seen by ERP, to CT and back, again seen by ERP for re-eval, medicated per MAR for pain, updated on POC.

## 2020-04-25 NOTE — ED NOTES
Patient given discharge instructions, follow up information, and prescription x 1, verbalized understanding, consent for narcotics signed, ambulatory out of ED w/steady gait.
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
## 2021-06-25 ENCOUNTER — HOSPITAL ENCOUNTER (EMERGENCY)
Facility: MEDICAL CENTER | Age: 36
End: 2021-06-25
Attending: EMERGENCY MEDICINE
Payer: COMMERCIAL

## 2021-06-25 VITALS
WEIGHT: 183.2 LBS | BODY MASS INDEX: 27.77 KG/M2 | HEART RATE: 72 BPM | HEIGHT: 68 IN | RESPIRATION RATE: 18 BRPM | OXYGEN SATURATION: 98 % | TEMPERATURE: 97.5 F | DIASTOLIC BLOOD PRESSURE: 99 MMHG | SYSTOLIC BLOOD PRESSURE: 138 MMHG

## 2021-06-25 DIAGNOSIS — K08.89 PAIN, DENTAL: ICD-10-CM

## 2021-06-25 DIAGNOSIS — K02.9 INFECTED DENTAL CARIES: ICD-10-CM

## 2021-06-25 DIAGNOSIS — K04.7 INFECTED DENTAL CARIES: ICD-10-CM

## 2021-06-25 PROCEDURE — 99283 EMERGENCY DEPT VISIT LOW MDM: CPT | Mod: EDC

## 2021-06-25 PROCEDURE — 700102 HCHG RX REV CODE 250 W/ 637 OVERRIDE(OP): Performed by: EMERGENCY MEDICINE

## 2021-06-25 PROCEDURE — A9270 NON-COVERED ITEM OR SERVICE: HCPCS | Performed by: EMERGENCY MEDICINE

## 2021-06-25 RX ORDER — AMOXICILLIN AND CLAVULANATE POTASSIUM 875; 125 MG/1; MG/1
1 TABLET, FILM COATED ORAL ONCE
Status: COMPLETED | OUTPATIENT
Start: 2021-06-25 | End: 2021-06-25

## 2021-06-25 RX ORDER — IBUPROFEN 600 MG/1
600 TABLET ORAL ONCE
Status: COMPLETED | OUTPATIENT
Start: 2021-06-25 | End: 2021-06-25

## 2021-06-25 RX ORDER — AMOXICILLIN AND CLAVULANATE POTASSIUM 875; 125 MG/1; MG/1
1 TABLET, FILM COATED ORAL 2 TIMES DAILY
Qty: 14 TABLET | Refills: 0 | Status: SHIPPED | OUTPATIENT
Start: 2021-06-25 | End: 2021-07-02

## 2021-06-25 RX ORDER — HYDROCODONE BITARTRATE AND ACETAMINOPHEN 5; 325 MG/1; MG/1
1 TABLET ORAL ONCE
Status: COMPLETED | OUTPATIENT
Start: 2021-06-25 | End: 2021-06-25

## 2021-06-25 RX ADMIN — HYDROCODONE BITARTRATE AND ACETAMINOPHEN 1 TABLET: 5; 325 TABLET ORAL at 13:32

## 2021-06-25 RX ADMIN — IBUPROFEN 600 MG: 600 TABLET, FILM COATED ORAL at 13:32

## 2021-06-25 RX ADMIN — AMOXICILLIN AND CLAVULANATE POTASSIUM 1 TABLET: 875; 125 TABLET, FILM COATED ORAL at 13:32

## 2021-06-25 NOTE — ED PROVIDER NOTES
"ED Provider Note    CHIEF COMPLAINT  Chief Complaint   Patient presents with   • Dental Pain       HPI  Walker Carrera is a 36 y.o. male who presents to the emergency department through triage for dental pain.  Patient describes right upper dental pain progressive over 2 days.  History of dental caries.  Cannot get an appointment with a dentist.  No facial oral swelling.  No difficulty swallowing although he does have pain with mastication.  No fever or chills.    REVIEW OF SYSTEMS  See HPI for further details. All other systems are negative.     PAST MEDICAL HISTORY   Denies    SOCIAL HISTORY  Social History     Tobacco Use   • Smoking status: Current Every Day Smoker     Packs/day: 0.50     Types: Cigarettes   • Smokeless tobacco: Never Used   Vaping Use   • Vaping Use: Never used   Substance and Sexual Activity   • Alcohol use: Not Currently   • Drug use: Yes     Types: Inhaled     Comment: marijuana   • Sexual activity: Not on file       SURGICAL HISTORY  patient denies any surgical history    CURRENT MEDICATIONS  Home Medications     Reviewed by Ivan Laureano R.N. (Registered Nurse) on 06/25/21 at 1031  Med List Status: Partial   Medication Last Dose Status        Patient Andrez Taking any Medications                       ALLERGIES  No Known Allergies    PHYSICAL EXAM  VITAL SIGNS: /110   Pulse 73   Temp 36.4 °C (97.5 °F) (Temporal)   Resp 16   Ht 1.727 m (5' 8\")   Wt 83.1 kg (183 lb 3.2 oz)   SpO2 99%   BMI 27.86 kg/m²   Pulse ox interpretation: I interpret this pulse ox as normal.  Constitutional: Alert in no apparent distress.  HENT: Normocephalic, atraumatic. Bilateral external ears normal, Nose normal. Moist mucous membranes.  Poor dentition.  Tender to percussion right upper premolar.  No gingival swelling or fluctuance.  No lingual elevation.  No facial swelling or cellulitis.  No meningeal irritation.  Uvula midline.  Tolerating secretions.  No stridor or dysphonia.  Eyes: " Pupils are equal and reactive, Conjunctiva normal.   Neck: Normal range of motion, Supple.   Lymphatic: No lymphadenopathy noted.  No cervical or submandibular lymphadenopathy.  Cardiovascular: Normal peripheral perfusion.  Thorax & Lungs:.   Skin: Warm, Dry  Musculoskeletal: Good range of motion in all major joints.   Neurologic: Alert oriented x4.  Ambulates independently  Psychiatric: Affect normal, Judgment normal, Mood normal.       COURSE & MEDICAL DECISION MAKING  Nursing notes and vital signs were reviewed. (See chart for details)  The patients records were reviewed, history was obtained from the patient;    ED evaluation most suggestive of infected dental caries.  No clinical evidence for gingival or other oral abscess.  No Aleksandr's.  No facial cellulitis.  No meningitis.  First dose Augmentin as well as Motrin and Norco for pain given in the emergency department.  Tolerated oral medications without difficulty.    Patient is stable for discharge at this time, anticipatory guidance provided,Augmentin for 7 days, Tylenol or ibuprofen for discomfort,, close follow-up is encouraged with dentist as soon as possible, and strict ED return instructions have been detailed. Patient is agreeable to the disposition and plan.    Patient's blood pressure was elevated in the emergency department, and has been referred to primary care for close monitoring.    FINAL IMPRESSION  (K08.89) Pain, dental  (K02.9,  K04.7) Infected dental caries      Electronically signed by: Michelle Melissa D.O., 6/25/2021 1:19 PM      This dictation was created using voice recognition software. The accuracy of the dictation is limited to the abilities of the software. I expect there may be some errors of grammar and possibly content. The nursing notes were reviewed and certain aspects of this information were incorporated into this note.

## 2021-06-25 NOTE — DISCHARGE INSTRUCTIONS
Follow-up with a dentist as soon as possible for re-evaluation and definitive treatment.  [facility] has dentists available.    Augmentin twice daily for 7 days.  Tylenol or Ibuprofen as needed for discomfort.    Soft, room temperature diet as tolerated.    Return to the Emergency Department for persistent or worsening pain, oral or facial swelling, difficulty swallowing or breathing, fever, vomiting or other new concerns.

## 2021-06-25 NOTE — ED NOTES
The patient has been provided with discharge education and information.  The patient was also provided with instructions on follow up care and return precautions.  The patient verbalizes understanding of discharge instructions, follow up care, and return precautions.  All questions have been answered.  Augmentin RX prescribed by ERP. Care Chest phone number and address provided to patient.  NAD, A/Ox4, good color and appropriate at time of discharge.  Patient ambulated out of department.

## 2021-06-25 NOTE — ED NOTES
"First interaction with patient.  Assumed care at this time.  Patient presents to the ER today for complaint of right upper dental pain x3 days.  He states \"my friend said that she had the same thing and she got a pain shot every hour, I want that.\"  He states that he has been unable to get into the dentist.    Gown provided, patient instructed to changed prior to ERP evaluation.  NPO status explained by this RN.  Call light provided.  Chart up for ERP.  "

## 2022-01-29 ENCOUNTER — HOSPITAL ENCOUNTER (EMERGENCY)
Facility: MEDICAL CENTER | Age: 37
End: 2022-01-29
Attending: EMERGENCY MEDICINE
Payer: COMMERCIAL

## 2022-01-29 VITALS
RESPIRATION RATE: 18 BRPM | WEIGHT: 185.63 LBS | HEART RATE: 97 BPM | DIASTOLIC BLOOD PRESSURE: 95 MMHG | HEIGHT: 68 IN | SYSTOLIC BLOOD PRESSURE: 132 MMHG | OXYGEN SATURATION: 98 % | BODY MASS INDEX: 28.13 KG/M2 | TEMPERATURE: 98.6 F

## 2022-01-29 DIAGNOSIS — N34.2 URETHRITIS: ICD-10-CM

## 2022-01-29 LAB
APPEARANCE UR: ABNORMAL
BACTERIA #/AREA URNS HPF: NEGATIVE /HPF
BILIRUB UR QL STRIP.AUTO: NEGATIVE
COLOR UR: YELLOW
EPI CELLS #/AREA URNS HPF: NEGATIVE /HPF
GLUCOSE UR STRIP.AUTO-MCNC: NEGATIVE MG/DL
HYALINE CASTS #/AREA URNS LPF: ABNORMAL /LPF
KETONES UR STRIP.AUTO-MCNC: NEGATIVE MG/DL
LEUKOCYTE ESTERASE UR QL STRIP.AUTO: ABNORMAL
MICRO URNS: ABNORMAL
NITRITE UR QL STRIP.AUTO: NEGATIVE
PH UR STRIP.AUTO: 6 [PH] (ref 5–8)
PROT UR QL STRIP: 30 MG/DL
RBC # URNS HPF: ABNORMAL /HPF
RBC UR QL AUTO: ABNORMAL
SP GR UR STRIP.AUTO: 1.02
UROBILINOGEN UR STRIP.AUTO-MCNC: 1 MG/DL
WBC #/AREA URNS HPF: ABNORMAL /HPF

## 2022-01-29 PROCEDURE — 700102 HCHG RX REV CODE 250 W/ 637 OVERRIDE(OP): Performed by: EMERGENCY MEDICINE

## 2022-01-29 PROCEDURE — 96372 THER/PROPH/DIAG INJ SC/IM: CPT

## 2022-01-29 PROCEDURE — 87591 N.GONORRHOEAE DNA AMP PROB: CPT

## 2022-01-29 PROCEDURE — 700111 HCHG RX REV CODE 636 W/ 250 OVERRIDE (IP): Performed by: EMERGENCY MEDICINE

## 2022-01-29 PROCEDURE — 87491 CHLMYD TRACH DNA AMP PROBE: CPT

## 2022-01-29 PROCEDURE — 81001 URINALYSIS AUTO W/SCOPE: CPT

## 2022-01-29 PROCEDURE — A9270 NON-COVERED ITEM OR SERVICE: HCPCS | Performed by: EMERGENCY MEDICINE

## 2022-01-29 PROCEDURE — 99284 EMERGENCY DEPT VISIT MOD MDM: CPT

## 2022-01-29 RX ORDER — DOXYCYCLINE 100 MG/1
100 TABLET ORAL ONCE
Status: COMPLETED | OUTPATIENT
Start: 2022-01-29 | End: 2022-01-29

## 2022-01-29 RX ORDER — CEFTRIAXONE 500 MG/1
500 INJECTION, POWDER, FOR SOLUTION INTRAMUSCULAR; INTRAVENOUS ONCE
Status: COMPLETED | OUTPATIENT
Start: 2022-01-29 | End: 2022-01-29

## 2022-01-29 RX ORDER — DOXYCYCLINE 100 MG/1
100 CAPSULE ORAL 2 TIMES DAILY
Qty: 20 CAPSULE | Refills: 0 | Status: SHIPPED | OUTPATIENT
Start: 2022-01-29 | End: 2022-02-08

## 2022-01-29 RX ADMIN — CEFTRIAXONE SODIUM 500 MG: 500 INJECTION, POWDER, FOR SOLUTION INTRAMUSCULAR; INTRAVENOUS at 23:11

## 2022-01-29 RX ADMIN — DOXYCYCLINE 100 MG: 100 TABLET, FILM COATED ORAL at 23:11

## 2022-01-29 NOTE — LETTER
1/31/2022               Walker Carrera  [address] [facility] [address]        Dear Walker (MR#[id number])    As we have been unable to contact you by phone, this letter is sent in regards to your, recent visit to the [facility] Emergency Department on 1/29/2022. During the visit, tests were performed to assist the physician in your medical diagnosis. A review of your tests requires that we notify you of the following:     Your culture test was POSITIVE for Gonorrhea, a sexually transmitted infection. This was already treated appropriately in the Emergency Department.       Your culture test was NEGATIVE for Chlamydia, so you may STOP the antibiotic prescribed for you (doxycycline).      Based on the above findings it is recommended that you seek testing for the presence of additional sexually transmitted infections, hepatitis, and HIV from the Health Department. Also, it is advised that you inform your sexual partner(s) within the previous 60 days of the above findings and direct them to the Health Department for testing, and to abstain from sexual intercourse seven days after the completion of antibiotic treatment. Should your symptoms progress, it is important that you follow up with your primary care physician, your local urgent care office, or return to the emergency department for further work up in order to prevent long term health issues.      Thank you for your cooperation in the matter.    Sincerely,  ED Culture Follow-Up Staff  Mariana Meyer, PharmD    [facility], Emergency Department  [address] [place] [id number] [phone] (ED Culture Line)

## 2022-01-30 LAB
C TRACH DNA SPEC QL NAA+PROBE: NEGATIVE
N GONORRHOEA DNA SPEC QL NAA+PROBE: POSITIVE
SPECIMEN SOURCE: ABNORMAL

## 2022-01-30 NOTE — ED NOTES
Patient provided discharge instructions. Patient verbalized understanding. Patient leaving ER in stable condition. Patient ambulatory with steady gait.

## 2022-01-30 NOTE — ED PROVIDER NOTES
"ED Provider Note    CHIEF COMPLAINT  Chief Complaint   Patient presents with   • UTI     patient states that it burns when he urinates, started 3 days ago       HPI  Walker Carrera is a 36 y.o. male who presents with dysuria and penile drainage.  The patient states he is had this over the last 3 days.  He does not have any abdominal pain.  He has not had any associated fevers.  He has not had any nausea or vomiting.    REVIEW OF SYSTEMS  No known sick contacts, no testicular pain    PHYSICAL EXAM  VITAL SIGNS: /87   Pulse 98   Temp 36.5 °C (97.7 °F) (Temporal)   Resp 18   Ht 1.727 m (5' 8\")   Wt 84.2 kg (185 lb 10 oz)   SpO2 98% Comment: Room air  BMI 28.22 kg/m²   In general the patient is unkempt but does not appear toxic    GI abdomen is soft    External genitalia the patient is uncircumcised with purulent drainage from the urethral meatus    Pulmonary chest clear to auscultation bilaterally    Cardiovascular S1-S2 with a regular rate and rhythm    COURSE & MEDICAL DECISION MAKING  Pertinent Labs & Imaging studies reviewed. (See chart for details)  This a 36-year-old male who presents with urethritis.  The patient be treated empirically for gonorrhea and chlamydia with Rocephin intramuscularly and doxycycline on an outpatient basis.  He will be discharged once we receive a urinalysis as he currently cannot urinate.  The patient is aware all of his partners need to be treated.  He will return if he is acutely worse or does not have significant improvement in 7 to 10 days.    FINAL IMPRESSION  1.  Urethritis       Disposition  The patient will be discharged in stable condition      Electronically signed by: Giovanni Cyr M.D., 1/29/2022 10:12 PM      "

## 2022-01-30 NOTE — ED TRIAGE NOTES
"Chief Complaint   Patient presents with   • UTI     patient states that it burns when he urinates, started 3 days ago       Patient states that he has had a burning sensation when he pees for approx. 3 days.    Patient denies any recent antibiotics.     Pt is alert and oriented, speaking in full sentences, follows commands and responds appropriately to questions. Resp are even and unlabored.      Pt placed in lobby. Pt educated on triage process. Pt encouraged to alert staff for any changes.     Patient and staff wearing appropriate PPE    /87   Pulse 98   Temp 36.5 °C (97.7 °F) (Temporal)   Resp 18   Ht 1.727 m (5' 8\")   Wt 84.2 kg (185 lb 10 oz)   SpO2 98%   "

## 2022-02-01 NOTE — ED NOTES
"ED Positive Culture Follow-up/Notification Note:    Date: 1/31/2022     Patient seen in the ED on 1/29/2022 for dysuria and penile drainage. Patient received ceftriaxone 500 mg IM x1 and doxycycline 100 mg PO x1 in the ED.  1. Urethritis       Discharge Medication List as of 1/29/2022 11:17 PM      START taking these medications    Details   doxycycline (MONODOX) 100 MG capsule Take 1 Capsule by mouth 2 times a day for 10 days., Disp-20 Capsule, R-0, Normal             Allergies: Patient has no known allergies.     Vitals:    01/29/22 2147 01/29/22 2157 01/29/22 2315   BP: 132/87  132/95   Pulse: 98  97   Resp: 18  18   Temp: 36.5 °C (97.7 °F)  37 °C (98.6 °F)   TempSrc: Temporal  Temporal   SpO2: 98%  98%   Weight:  84.2 kg (185 lb 10 oz)    Height:  1.727 m (5' 8\")        Final cultures:   Results     Procedure Component Value Units Date/Time    Chlamydia/GC PCR Urine Or Swab [[id number]]  (Abnormal) Collected: 01/29/22 2305    Order Status: Completed Specimen: Urine, First Catch Updated: 01/30/22 1839     C. trachomatis by PCR Negative     N. gonorrhoeae by PCR POSITIVE     Source Other    URINALYSIS (UA) [[id number]]  (Abnormal) Collected: 01/29/22 2305    Order Status: Completed Specimen: Urine, Clean Catch Updated: 01/29/22 2337     Color Yellow     Character Turbid     Specific Gravity 1.023     Ph 6.0     Glucose Negative mg/dL      Ketones Negative mg/dL      Protein 30 mg/dL      Bilirubin Negative     Urobilinogen, Urine 1.0     Nitrite Negative     Leukocyte Esterase Large     Occult Blood Large     Micro Urine Req Microscopic     Comment: Urine sample <1 ml, microscopic performed on unspun urine       CHLAMYDIA & GC BY PCR [[id number]]     Order Status: Canceled Specimen: Genital           Plan:   Patient treated for gonorrhea in the ER. No additional treatment necessary. Attempted to call patient but phone disconnected. Sent patient letter notifying of results, and provided counseling to abstain from " sexual intercourse 7 days after antibiotic therapy is completed, to notify any sexual partners within the last 60 days to go the the Health Department for testing, to seek further HIV/STD testing, and to seek medical attention if symptoms persist.    Mariana Meyer, PharmD  PGY2 Infectious Diseases Pharmacy Resident

## 2022-02-02 NOTE — ED NOTES
Patient resting in stretcher alert. Respirations unlabored. Reports cough with yellow-green sputum x few days and CP with cough. Awaiting CXR. Call light in reach, continuing to monitor.    Acitretin Pregnancy And Lactation Text: This medication is Pregnancy Category X and should not be given to women who are pregnant or may become pregnant in the future. This medication is excreted in breast milk.

## 2024-05-20 ENCOUNTER — APPOINTMENT (OUTPATIENT)
Dept: URGENT CARE | Facility: CLINIC | Age: 39
End: 2024-05-20